# Patient Record
Sex: FEMALE | Race: WHITE | NOT HISPANIC OR LATINO | Employment: FULL TIME | URBAN - METROPOLITAN AREA
[De-identification: names, ages, dates, MRNs, and addresses within clinical notes are randomized per-mention and may not be internally consistent; named-entity substitution may affect disease eponyms.]

---

## 2017-10-03 ENCOUNTER — OFFICE VISIT (OUTPATIENT)
Dept: OPHTHALMOLOGY | Facility: CLINIC | Age: 37
End: 2017-10-03
Payer: COMMERCIAL

## 2017-10-03 DIAGNOSIS — E06.3 HASHIMOTO'S THYROIDITIS: ICD-10-CM

## 2017-10-03 DIAGNOSIS — M35.01 KERATITIS SICCA, BILATERAL: Primary | ICD-10-CM

## 2017-10-03 DIAGNOSIS — H52.7 REFRACTION DISORDER: ICD-10-CM

## 2017-10-03 PROCEDURE — 99999 PR PBB SHADOW E&M-NEW PATIENT-LVL I: CPT | Mod: PBBFAC,,, | Performed by: OPHTHALMOLOGY

## 2017-10-03 PROCEDURE — 92002 INTRM OPH EXAM NEW PATIENT: CPT | Mod: S$GLB,,, | Performed by: OPHTHALMOLOGY

## 2017-10-03 RX ORDER — LIOTHYRONINE SODIUM 5 UG/1
25 TABLET ORAL DAILY
COMMUNITY

## 2017-10-03 RX ORDER — LEVOTHYROXINE SODIUM 112 UG/1
112 TABLET ORAL DAILY
COMMUNITY

## 2017-10-03 NOTE — PROGRESS NOTES
HPI     C/o blurred vision starting about 2 months ago OS>OD.  Was told she had   infection behind cornea,  and was given tobramycin drops.  Told it was   okay to wear contacts. Her neuro gave her gentamycin ointment.   About a   week ago she began to have pain.  She stopped SCL wear three days ago.    3-4 on pain scale.  Has had lots of discharge, crusting in a.m.  No drops   or ointment for 3 days.    Last edited by Ruth Corcoran on 10/3/2017  3:17 PM. (History)            Assessment /Plan     For exam results, see Encounter Report.      ICD-10-CM ICD-9-CM    1. Keratitis sicca, bilateral H16.223 370.8 Severe dry eye changes ou   2. 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 Dry eye instruction sheet provided. Options discussed including the use of punctal plugs, [place] therapy, Restasis, and the use of preservative free products, as well as to use conservative management.     Patient elects to use :  Omega 3 fish oils of [employer] or PRN Dry Eye formula [facility]  Systane Ultra  Genteal Gel at bedtime    Explained that patient may have trouble getting back into contacts    Return to clinic 4 weeks to determine if she needs a restasis trial

## 2017-10-25 ENCOUNTER — OFFICE VISIT (OUTPATIENT)
Dept: OPHTHALMOLOGY | Facility: CLINIC | Age: 37
End: 2017-10-25
Payer: COMMERCIAL

## 2017-10-25 DIAGNOSIS — M35.01 KERATITIS SICCA, BILATERAL: Primary | ICD-10-CM

## 2017-10-25 DIAGNOSIS — E06.3 HASHIMOTO'S THYROIDITIS: ICD-10-CM

## 2017-10-25 DIAGNOSIS — H52.7 REFRACTION DISORDER: ICD-10-CM

## 2017-10-25 PROCEDURE — 92012 INTRM OPH EXAM EST PATIENT: CPT | Mod: S$GLB,,, | Performed by: OPHTHALMOLOGY

## 2017-10-25 PROCEDURE — 99999 PR PBB SHADOW E&M-EST. PATIENT-LVL I: CPT | Mod: PBBFAC,,, | Performed by: OPHTHALMOLOGY

## 2017-10-25 RX ORDER — DESOGESTREL AND ETHINYL ESTRADIOL 0.15-0.03
1 KIT ORAL
COMMUNITY
Start: 2015-09-03

## 2017-10-25 RX ORDER — CYCLOSPORINE 0.5 MG/ML
1 EMULSION OPHTHALMIC 2 TIMES DAILY
Qty: 60 VIAL | Refills: 6 | Status: SHIPPED | OUTPATIENT
Start: 2017-10-25

## 2017-10-25 RX ORDER — VENLAFAXINE HYDROCHLORIDE 75 MG/1
CAPSULE, EXTENDED RELEASE ORAL
COMMUNITY
Start: 2017-04-14

## 2017-10-25 NOTE — PROGRESS NOTES
HPI     Follow-up    Additional comments: dry eye check. eyes are still uncomfortable. genteal   [place]           Comments   1. Dry eye  2. Hashimotos    Nordic naturals 2 pills bid  Systane ultra throughout the day  Genteal gel nightly - does not  use       Last edited by Renard Maxwell MD on 10/25/2017  2:11 PM. (History)            Assessment /Plan     For exam results, see Encounter Report.      ICD-10-CM ICD-9-CM    1. Keratitis sicca, bilateral H16.223 370.8 cycloSPORINE (RESTASIS) 0.05 % ophthalmic emulsion  Uncertain if Restasis is helping or not since patient started it about 2 weeks ago and also started systane Ultra. Will approve in the future if patient desires.  Being worked up for lupus due to swollen ankles.    2. 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 Hashimoto's thyroiditis E06.3 245.2 cycloSPORINE (RESTASIS) 0.05 % ophthalmic emulsion       Exam much better today and patient is not using the Genteal. Is using Restasis from her mom.   Systane and [place] [employer]aturals - Ultimate Omega Formula     RETURN TO CLINIC prn